# Patient Record
(demographics unavailable — no encounter records)

---

## 2025-07-10 NOTE — DEVELOPMENTAL MILESTONES
[Can suck, swallow and breathe easily] : can suck, swallow and breathe easily [Follows your face] : follows your face [Turns and calms to your voice] : turns and calms to your voice [Eats well] : eats well [Work for toy] : work for toy [Regards own hand] : regards own hand [Responds to affection] : responds to affection [Social smile] : social smile [Puts hands together] : puts hands together [Pulls to sit - no head lag] : pulls to sit - no head lag [Roll over] : roll over [Chest up - arm support] : chest up - arm support [Bears weight on legs] : bears weight on legs  [Turns to voices] : turns to voices [Squeals] : squeals  [Spontaneous Excessive Babbling] : no spontaneous excessive babbling

## 2025-07-10 NOTE — HISTORY OF PRESENT ILLNESS
[FreeTextEntry1] : 3/4/2025  Birth notes   HISTORY/ PHYSICAL EXAM:  History and Physical Exam: Peds called at delivery for di di twins/. Baby girl B born Breech  1:41 at 36.3 wks via  to a 29 y/o  B+ blood type mother. Sonos showed transverse lie. Maternal history of anemia, PCOS, appendectomy. no significant prenatal history. PNL nr/immune/-, GBS - on . AROM at delivery with bloody fluids, possible placental abruption. Baby w/d/s/s with APGARS of 8/8, Mom would like to breast feed, declines   7/10/2025 with the mother.  She feels that Katie lags behind her twin sister5 developmentally

## 2025-07-10 NOTE — PHYSICAL EXAM
[Well-appearing] : well-appearing [Normocephalic] : normocephalic [Anterior fontanel- Open] : anterior fontanel- open [Anterior fontanel- Soft] : anterior fontanel- soft [Anterior fontanel- Flat] : anterior fontanel- flat [No dysmorphic facial features] : no dysmorphic facial features [No ocular abnormalities] : no ocular abnormalities [Neck supple] : neck supple [Lungs clear] : lungs clear [Heart sounds regular in rate and rhythm] : heart sounds regular in rate and rhythm [Soft] : soft [No organomegaly] : no organomegaly [No abnormal neurocutaneous stigmata or skin lesions] : no abnormal neurocutaneous stigmata or skin lesions [Straight] : straight [No brittny or dimples] : no brittny or dimples [No deformities] : no deformities [Pupils reactive to light] : pupils reactive to light [Turns to light] : turns to light [Tracks face, light or objects with full extraocular movements] : tracks face, light or objects with full extraocular movements [No facial asymmetry or weakness] : no facial asymmetry or weakness [No nystagmus] : no nystagmus [Responds to voice/sounds] : responds to voice/sounds [Midline tongue] : midline tongue [No fasciculations] : no fasciculations [Normal axial and appendicular muscle tone with symmetric limb movements] : normal axial and appendicular muscle tone with symmetric limb movements [Normal bulk] : normal bulk [Lift head in prone] : lift head in prone [Roll over] : roll over [Stands holding on] : stands holding on [No abnormal involuntary movements] : no abnormal involuntary movements [2+ biceps] : 2+ biceps [Knee jerks] : knee jerks [Ankle jerks] : ankle jerks [No ankle clonus] : no ankle clonus [Responds to touch and tickle] : responds to touch and tickle